# Patient Record
Sex: FEMALE | Race: WHITE | NOT HISPANIC OR LATINO
[De-identification: names, ages, dates, MRNs, and addresses within clinical notes are randomized per-mention and may not be internally consistent; named-entity substitution may affect disease eponyms.]

---

## 2018-03-07 ENCOUNTER — OTHER (OUTPATIENT)
Age: 58
End: 2018-03-07

## 2018-04-27 ENCOUNTER — APPOINTMENT (OUTPATIENT)
Dept: BREAST CENTER | Facility: CLINIC | Age: 58
End: 2018-04-27

## 2018-10-02 ENCOUNTER — RESULT REVIEW (OUTPATIENT)
Age: 58
End: 2018-10-02

## 2019-08-17 ENCOUNTER — TRANSCRIPTION ENCOUNTER (OUTPATIENT)
Age: 59
End: 2019-08-17

## 2019-11-20 ENCOUNTER — APPOINTMENT (OUTPATIENT)
Dept: DERMATOLOGY | Facility: CLINIC | Age: 59
End: 2019-11-20

## 2020-05-29 ENCOUNTER — APPOINTMENT (OUTPATIENT)
Dept: RHEUMATOLOGY | Facility: CLINIC | Age: 60
End: 2020-05-29

## 2020-06-17 ENCOUNTER — APPOINTMENT (OUTPATIENT)
Dept: RHEUMATOLOGY | Facility: CLINIC | Age: 60
End: 2020-06-17
Payer: COMMERCIAL

## 2020-06-17 VITALS — HEIGHT: 70 IN | WEIGHT: 165 LBS | BODY MASS INDEX: 23.62 KG/M2

## 2020-06-17 DIAGNOSIS — M25.50 PAIN IN UNSPECIFIED JOINT: ICD-10-CM

## 2020-06-17 DIAGNOSIS — M35.1 OTHER OVERLAP SYNDROMES: ICD-10-CM

## 2020-06-17 PROCEDURE — 99203 OFFICE O/P NEW LOW 30 MIN: CPT | Mod: 95

## 2020-06-18 NOTE — ASSESSMENT
[FreeTextEntry1] : 59-year-old  female being seen as a new patient today via telemedicine. She has seen 2 rheumatologists in the past, at one point she was diagnosed with mixed connective tissue disease, at another point she was diagnosed with ankylosing spondylitis. She has tried Plaquenil and sulfasalazine in the past. Both these drugs have had minimal relief.\par \par Her current review of systems is positive for fatigue, arthralgias, poor sleep, occasional  oral ulcers as well as sicca symptoms. She denies psoriasis or colitis. She does not recall whether her HLA-B27 gene was positive or negative.\par \par I do not have any medical records for review today. On my limited exam she has full range of motion of her joints without any evidence of inflammatory arthritis.\par \par Certainly based on her history and the fact that she has seen 2 rheumatologists she could certainly have mixed connective tissue disease which is her current diagnosis. Unfortunately I am not able to examine her fully today and I do not have any medical records available for review.\par \par Plan-\par -She is to have repeat labs done to rule out possible autoimmune process\par -At this time there is no indication for steroid use or use of another DMARD unless I have definitive diagnosis.\par -Followup in the office in one to 2 weeks when she goes for blood work\par -She is aware to call if her symptoms worsen\par -I will evaluate her for other connective tissue diseases as well as sarcoid and she's concerned for tickborne diseases which will be done as well.

## 2020-06-18 NOTE — PHYSICAL EXAM
[General Appearance - Alert] : alert [Sclera] : the sclera and conjunctiva were normal [General Appearance - Well Developed] : well developed [General Appearance - Well Nourished] : well nourished [Abnormal Walk] : normal gait [Musculoskeletal - Swelling] : no joint swelling seen [Neck Appearance] : the appearance of the neck was normal [Impaired Insight] : insight and judgment were intact [] : no rash [Oriented To Time, Place, And Person] : oriented to person, place, and time [Affect] : the affect was normal [FreeTextEntry1] : FROM neck, shoulders, elbows, wrists, hands, hips, knees, ankles and feet, including the small joints of the hands and feet without any evidence of inflammatory arthritis

## 2020-06-18 NOTE — HISTORY OF PRESENT ILLNESS
[FreeTextEntry1] : New pt visit with the patient via tele-video using [employer] patient gives verbal consent. Patient is at remote location. Provider is at [address] [place] [address]\par \par 59-year-old  female seeking a new rheumatologist. She has previously been under the care of Dr. Mejias and Dr. Hurd. She was diagnosed with possible mixed connective tissue disease as well as possible ankylosing spondylitis. She has tried Plaquenil and sulfasalazine in the past with minimal relief.\par \par Currently she complains of her hair thinning. She admits to some dryness in her eyes as well as her mouth. On occasion she will get lesions in her mouth. She admits to headaches and pains and fatigue. She rates her joint pain as moderate depending on that day and activity level. She denies psoriasis colitis. She denies asthma, frequent sinus infections and ear infections. She denies fevers or chills or night sweats.\par \par She has an average appetite and denies weight loss. She denies fevers or chills or night sweats.\par \par There is a family history of autoimmunity, her brother has sarcoid. She denies any issues with blood clots and or thromboembolism.\par \par She is otherwise up to date on her age-appropriate screenings.

## 2020-06-18 NOTE — CONSULT LETTER
[Dear  ___] : Dear  [unfilled], [Consult Letter:] : I had the pleasure of evaluating your patient, [unfilled]. [Please see my note below.] : Please see my note below. [Sincerely,] : Sincerely, [Consult Closing:] : Thank you very much for allowing me to participate in the care of this patient.  If you have any questions, please do not hesitate to contact me. [FreeTextEntry3] : Talia Lan D.O\par

## 2020-10-03 ENCOUNTER — TRANSCRIPTION ENCOUNTER (OUTPATIENT)
Age: 60
End: 2020-10-03

## 2022-04-19 ENCOUNTER — APPOINTMENT (OUTPATIENT)
Dept: GASTROENTEROLOGY | Facility: CLINIC | Age: 62
End: 2022-04-19

## 2022-05-24 ENCOUNTER — NON-APPOINTMENT (OUTPATIENT)
Age: 62
End: 2022-05-24

## 2022-05-28 ENCOUNTER — NON-APPOINTMENT (OUTPATIENT)
Age: 62
End: 2022-05-28

## 2022-10-15 ENCOUNTER — NON-APPOINTMENT (OUTPATIENT)
Age: 62
End: 2022-10-15

## 2023-08-14 RX ORDER — NALOXEGOL OXALATE 25 MG/1
25 TABLET, FILM COATED ORAL
Qty: 30 | Refills: 6 | Status: ACTIVE | COMMUNITY
Start: 2023-08-14 | End: 1900-01-01

## 2023-08-14 RX ORDER — SODIUM SULFATE, POTASSIUM SULFATE AND MAGNESIUM SULFATE 1.6; 3.13; 17.5 G/177ML; G/177ML; G/177ML
17.5-3.13-1.6 SOLUTION ORAL
Qty: 1 | Refills: 0 | Status: ACTIVE | COMMUNITY
Start: 2023-08-14 | End: 1900-01-01

## 2023-09-22 ENCOUNTER — APPOINTMENT (OUTPATIENT)
Dept: GASTROENTEROLOGY | Facility: CLINIC | Age: 63
End: 2023-09-22
Payer: COMMERCIAL

## 2023-09-22 VITALS
DIASTOLIC BLOOD PRESSURE: 75 MMHG | SYSTOLIC BLOOD PRESSURE: 128 MMHG | WEIGHT: 175 LBS | HEIGHT: 70 IN | BODY MASS INDEX: 25.05 KG/M2

## 2023-09-22 DIAGNOSIS — K59.09 OTHER CONSTIPATION: ICD-10-CM

## 2023-09-22 DIAGNOSIS — R10.9 UNSPECIFIED ABDOMINAL PAIN: ICD-10-CM

## 2023-09-22 DIAGNOSIS — K59.00 CONSTIPATION, UNSPECIFIED: ICD-10-CM

## 2023-09-22 PROCEDURE — 99203 OFFICE O/P NEW LOW 30 MIN: CPT

## 2023-09-22 RX ORDER — LINACLOTIDE 72 UG/1
72 CAPSULE, GELATIN COATED ORAL
Qty: 30 | Refills: 6 | Status: ACTIVE | COMMUNITY
Start: 2023-09-22 | End: 1900-01-01

## 2023-10-31 ENCOUNTER — NON-APPOINTMENT (OUTPATIENT)
Age: 63
End: 2023-10-31

## 2023-11-13 ENCOUNTER — NON-APPOINTMENT (OUTPATIENT)
Age: 63
End: 2023-11-13

## 2023-12-27 DIAGNOSIS — K21.9 GASTRO-ESOPHAGEAL REFLUX DISEASE W/OUT ESOPHAGITIS: ICD-10-CM

## 2023-12-27 RX ORDER — LUBIPROSTONE 8 UG/1
8 CAPSULE ORAL TWICE DAILY
Qty: 60 | Refills: 0 | Status: ACTIVE | COMMUNITY
Start: 2023-12-27 | End: 1900-01-01

## 2023-12-27 RX ORDER — FAMOTIDINE 40 MG/1
40 TABLET, FILM COATED ORAL
Qty: 30 | Refills: 0 | Status: ACTIVE | COMMUNITY
Start: 2023-12-27 | End: 1900-01-01